# Patient Record
Sex: MALE | Race: WHITE | NOT HISPANIC OR LATINO | Employment: OTHER | URBAN - METROPOLITAN AREA
[De-identification: names, ages, dates, MRNs, and addresses within clinical notes are randomized per-mention and may not be internally consistent; named-entity substitution may affect disease eponyms.]

---

## 2024-01-20 ENCOUNTER — HOSPITAL ENCOUNTER (EMERGENCY)
Facility: HOSPITAL | Age: 44
Discharge: HOME | End: 2024-01-21
Attending: STUDENT IN AN ORGANIZED HEALTH CARE EDUCATION/TRAINING PROGRAM

## 2024-01-20 VITALS
RESPIRATION RATE: 18 BRPM | SYSTOLIC BLOOD PRESSURE: 121 MMHG | WEIGHT: 190 LBS | DIASTOLIC BLOOD PRESSURE: 73 MMHG | BODY MASS INDEX: 27.2 KG/M2 | OXYGEN SATURATION: 100 % | TEMPERATURE: 98 F | HEART RATE: 109 BPM | HEIGHT: 70 IN

## 2024-01-20 DIAGNOSIS — S61.211A LACERATION OF LEFT INDEX FINGER WITHOUT FOREIGN BODY WITHOUT DAMAGE TO NAIL, INITIAL ENCOUNTER: Primary | ICD-10-CM

## 2024-01-20 PROCEDURE — 12001 RPR S/N/AX/GEN/TRNK 2.5CM/<: CPT | Mod: F1

## 2024-01-20 PROCEDURE — 63600175 PHARM REV CODE 636 W HCPCS: Performed by: EMERGENCY MEDICINE

## 2024-01-20 PROCEDURE — 25000003 PHARM REV CODE 250: Performed by: EMERGENCY MEDICINE

## 2024-01-20 PROCEDURE — 99284 EMERGENCY DEPT VISIT MOD MDM: CPT | Mod: 25

## 2024-01-20 PROCEDURE — 90715 TDAP VACCINE 7 YRS/> IM: CPT | Performed by: EMERGENCY MEDICINE

## 2024-01-20 PROCEDURE — 90471 IMMUNIZATION ADMIN: CPT | Performed by: EMERGENCY MEDICINE

## 2024-01-20 RX ORDER — DOXYCYCLINE 100 MG/1
100 CAPSULE ORAL 2 TIMES DAILY
Qty: 20 CAPSULE | Refills: 0 | Status: SHIPPED | OUTPATIENT
Start: 2024-01-20 | End: 2024-01-30

## 2024-01-20 RX ORDER — LIDOCAINE HYDROCHLORIDE 10 MG/ML
10 INJECTION, SOLUTION EPIDURAL; INFILTRATION; INTRACAUDAL; PERINEURAL
Status: COMPLETED | OUTPATIENT
Start: 2024-01-20 | End: 2024-01-20

## 2024-01-20 RX ADMIN — CLOSTRIDIUM TETANI TOXOID ANTIGEN (FORMALDEHYDE INACTIVATED), CORYNEBACTERIUM DIPHTHERIAE TOXOID ANTIGEN (FORMALDEHYDE INACTIVATED), BORDETELLA PERTUSSIS TOXOID ANTIGEN (GLUTARALDEHYDE INACTIVATED), BORDETELLA PERTUSSIS FILAMENTOUS HEMAGGLUTININ ANTIGEN (FORMALDEHYDE INACTIVATED), BORDETELLA PERTUSSIS PERTACTIN ANTIGEN, AND BORDETELLA PERTUSSIS FIMBRIAE 2/3 ANTIGEN 0.5 ML: 5; 2; 2.5; 5; 3; 5 INJECTION, SUSPENSION INTRAMUSCULAR at 11:01

## 2024-01-20 RX ADMIN — LIDOCAINE HYDROCHLORIDE 100 MG: 10 INJECTION, SOLUTION EPIDURAL; INFILTRATION; INTRACAUDAL; PERINEURAL at 10:01

## 2024-01-21 NOTE — DISCHARGE INSTRUCTIONS
Doxycycline as directed until all gone  Follow-up as directed   Suture removal next 10 days   Return for any concerns

## 2024-01-21 NOTE — ED PROVIDER NOTES
Encounter Date: 1/20/2024       History     Chief Complaint   Patient presents with    Laceration     On finger on L hand.  Tetanus not up to date     44-year-old male presents emergency department they laceration over the PIP joint of the left index finger.  Patient states that he was moving a toilet when he sustained a flap-type laceration to the index finger.  Patient denies any decreased range of motion, denies any numbness or tingling.  Unsure of his last tetanus.  Patient is right-hand dominant      Review of patient's allergies indicates:   Allergen Reactions    Opioids - morphine analogues Hives    Oxycodone-acetaminophen Hives    Penicillins Hives     No past medical history on file.  No past surgical history on file.  No family history on file.     Review of Systems   Constitutional: Negative.    Skin:  Positive for wound.   All other systems reviewed and are negative.      Physical Exam     Initial Vitals [01/20/24 2209]   BP Pulse Resp Temp SpO2   121/73 109 18 98.2 °F (36.8 °C) 100 %      MAP       --         Physical Exam    Nursing note and vitals reviewed.  Constitutional: He appears well-developed and well-nourished.   Musculoskeletal:         General: Normal range of motion.     Skin:   Flap-type laceration over the PIP joint of the left index finger patient has complete active range of motion with passive resistance to the D IP, PIP and MCP joint, able to flex and extend actively without restriction.         ED Course   Lac Repair    Date/Time: 1/20/2024 11:34 PM    Performed by: Brittany Downs FNP  Authorized by: Adan Roldan MD    Consent:     Consent obtained:  Verbal    Consent given by:  Patient    Risks discussed:  Infection, need for additional repair, pain, poor cosmetic result, poor wound healing, retained foreign body, tendon damage, vascular damage and nerve damage  Universal protocol:     Patient identity confirmed:  Verbally with patient  Laceration details:     Location:   Finger    Finger location:  L index finger    Length (cm):  0.5  Pre-procedure details:     Preparation:  Patient was prepped and draped in usual sterile fashion and imaging obtained to evaluate for foreign bodies  Exploration:     Imaging obtained: x-ray      Imaging outcome: foreign body not noted      Wound exploration: wound explored through full range of motion and entire depth of wound visualized      Wound extent: no foreign bodies/material noted    Treatment:     Area cleansed with:  Saline    Amount of cleaning:  Extensive    Irrigation solution:  Sterile saline    Irrigation method:  Syringe    Visualized foreign bodies/material removed: no    Skin repair:     Repair method:  Sutures    Suture size:  5-0    Suture technique:  Simple interrupted    Number of sutures:  3  Approximation:     Approximation:  Close  Repair type:     Repair type:  Simple  Post-procedure details:     Dressing:  Splint for protection    Procedure completion:  Tolerated well, no immediate complications    Labs Reviewed - No data to display       Imaging Results              X-Ray Finger 2 or More Views Left (In process)                      Medications   LIDOcaine (PF) 10 mg/ml (1%) injection 100 mg (has no administration in time range)   Tdap vaccine injection 0.5 mL (0.5 mLs Intramuscular Given 1/20/24 5358)     Medical Decision Making  44-year-old male presents emergency department they laceration over the PIP joint of the left index finger.  Patient states that he was moving a toilet when he sustained a flap-type laceration to the index finger.  Patient denies any decreased range of motion, denies any numbness or tingling.  Unsure of his last tetanus.  Patient is right-hand dominant    Considerations include but not limited to, retained foreign body, skin laceration, tendon laceration    44-year-old male presents emergency department with a flap-type laceration with a PIP joint of the left index finger patient has active range  of motion with passive resistance to all joints, able to extend freely without any deficits.  No evidence of tendon injury or deficits on physical exam.  X-ray imaging is unremarkable for fracture retained foreign body.  Wound was cleaned and sutured maintaining sterile technique.  Patient's tetanus was updated he will be discharged home with prophylactic antibiotics in a finger splint given return precautions    Amount and/or Complexity of Data Reviewed  Radiology: ordered. Decision-making details documented in ED Course.    Risk  Prescription drug management.                                      Clinical Impression:  Final diagnoses:  [S61211A] Laceration of left index finger without foreign body without damage to nail, initial encounter (Primary)          ED Disposition Condition    Discharge Stable          ED Prescriptions       Medication Sig Dispense Start Date End Date Auth. Provider    doxycycline (VIBRAMYCIN) 100 MG Cap Take 1 capsule (100 mg total) by mouth 2 (two) times daily. for 10 days 20 capsule 1/20/2024 1/30/2024 rBittany Downs FNP          Follow-up Information       Follow up With Specialties Details Why Contact Info    [facility]  Schedule an appointment as soon as possible for a visit   [facility] [address] [id number]  [phone]               Brittany Downs FNP  01/20/24 3428

## 2024-01-30 ENCOUNTER — HOSPITAL ENCOUNTER (EMERGENCY)
Facility: HOSPITAL | Age: 44
Discharge: HOME | End: 2024-01-30
Attending: EMERGENCY MEDICINE

## 2024-01-30 VITALS
TEMPERATURE: 98 F | BODY MASS INDEX: 27.28 KG/M2 | WEIGHT: 180 LBS | SYSTOLIC BLOOD PRESSURE: 146 MMHG | DIASTOLIC BLOOD PRESSURE: 91 MMHG | HEIGHT: 68 IN | RESPIRATION RATE: 18 BRPM | HEART RATE: 81 BPM | OXYGEN SATURATION: 98 %

## 2024-01-30 DIAGNOSIS — Z48.02 ENCOUNTER FOR REMOVAL OF SUTURES: Primary | ICD-10-CM

## 2024-01-30 PROCEDURE — 99281 EMR DPT VST MAYX REQ PHY/QHP: CPT

## 2024-01-30 NOTE — FIRST PROVIDER EVALUATION
Emergency Department TeleTriage Encounter Note      CHIEF COMPLAINT    Chief Complaint   Patient presents with    Suture / Staple Removal     L index finger       VITAL SIGNS   Initial Vitals [01/30/24 1047]   BP Pulse Resp Temp SpO2   (!) 146/91 81 18 98.2 °F (36.8 °C) 98 %      MAP       --            ALLERGIES    Review of patient's allergies indicates:   Allergen Reactions    Opioids - morphine analogues Hives    Oxycodone-acetaminophen Hives    Penicillins Hives       PROVIDER TRIAGE NOTE  Verbal consent for the teletriage evaluation was given by the patient at the start of the evaluation.  All efforts will be made to maintain patient's privacy during the evaluation.      This is a teletriage evaluation of a 44 y.o. male presenting to the ED with c/o suture removal to left index finger.  Placed 8 days PTA.  No problems. Limited physical exam via telehealth: The patient is awake, alert, answering questions appropriately and is not in respiratory distress.  As the Teletriage provider, I performed an initial assessment and ordered appropriate labs and imaging studies, if any, to facilitate the patient's care once placed in the ED. Once a room is available, care and a full evaluation will be completed by an alternate ED provider.  Any additional orders and the final disposition will be determined by that provider.  All imaging and labs will not be followed-up by the Teletriage Team, including myself.          ORDERS  Labs Reviewed - No data to display    ED Orders (720h ago, onward)      None              Virtual Visit Note: The provider triage portion of this emergency department evaluation and documentation was performed via [employer], a HIPAA-compliant telemedicine application, in concert with a tele-presenter in the room. A face to face patient evaluation with one of my colleagues will occur once the patient is placed in an emergency department room.      DISCLAIMER: This note was prepared with M*Modal  voice recognition transcription software. Garbled syntax, mangled pronouns, and other bizarre constructions may be attributed to that software system.

## 2024-01-30 NOTE — ED PROVIDER NOTES
Encounter Date: 1/30/2024       History     Chief Complaint   Patient presents with    Suture / Staple Removal     L index finger     Presents for suture removal.  Patient has sutures x3 to the left index finger.  Denies fever.  He denies signs and symptoms of infection.      Review of patient's allergies indicates:   Allergen Reactions    Opioids - morphine analogues Hives    Oxycodone-acetaminophen Hives    Penicillins Hives     No past medical history on file.  No past surgical history on file.  No family history on file.     Review of Systems   Constitutional:  Negative for fever.   Respiratory:  Negative for cough, shortness of breath and wheezing.    Cardiovascular:  Negative for chest pain, palpitations and leg swelling.   Gastrointestinal:  Negative for abdominal pain, diarrhea, nausea and vomiting.   Musculoskeletal:  Negative for back pain.   Skin:  Negative for rash.        Three sutures to the left index finger.  The wound looks clean.   Neurological:  Negative for weakness.       Physical Exam     Initial Vitals [01/30/24 1047]   BP Pulse Resp Temp SpO2   (!) 146/91 81 18 98.2 °F (36.8 °C) 98 %      MAP       --         Physical Exam    Constitutional: He appears well-developed and well-nourished.   HENT:   Mouth/Throat: Oropharynx is clear and moist.   Eyes: Conjunctivae are normal.   Neck: Neck supple.   Normal range of motion.  Cardiovascular:  Normal rate and regular rhythm.           Pulmonary/Chest: Breath sounds normal. No respiratory distress.   Musculoskeletal:         General: Normal range of motion.      Cervical back: Normal range of motion and neck supple.      Comments: Patient has full range of motion to that left index finger.     Neurological: He is alert and oriented to person, place, and time. No sensory deficit. GCS score is 15. GCS eye subscore is 4. GCS verbal subscore is 5. GCS motor subscore is 6.   Skin: Skin is warm. Capillary refill takes less than 2 seconds. No erythema.    There is no sign or symptoms of infection.  There is no erythema no drainage.    Psychiatric: He has a normal mood and affect. Thought content normal.         ED Course   Procedures  Labs Reviewed - No data to display       Imaging Results    None          Medications - No data to display  Medical Decision Making  He reports he has had the sutures in for about 11 days.    Amount and/or Complexity of Data Reviewed  Discussion of management or test interpretation with external provider(s): Three sutures removed from the left index finger.  Wound is healing without signs or symptoms of infection.  A Band-Aid was placed.  Patient has been instructed to follow up with primary care doctor.  at no time while in the ED did appear to be in any acute distress.                                      Clinical Impression:  Final diagnoses:  [Z48.02] Encounter for removal of sutures (Primary)          ED Disposition Condition    Discharge Stable          ED Prescriptions    None       Follow-up Information       Follow up With Specialties Details Why Contact Wm    [facility]  In 3 days  [address]  [facility] [address]  [phone]               Mely Tate NP  01/30/24 6572

## 2024-01-30 NOTE — DISCHARGE INSTRUCTIONS
Keep the wound clean and dry.  Follow up your primary care doctor return to the ED for any worsening symptoms or any other concerns.